# Patient Record
Sex: FEMALE | Race: WHITE | NOT HISPANIC OR LATINO | Employment: OTHER | URBAN - METROPOLITAN AREA
[De-identification: names, ages, dates, MRNs, and addresses within clinical notes are randomized per-mention and may not be internally consistent; named-entity substitution may affect disease eponyms.]

---

## 2023-01-23 ENCOUNTER — TRANSFERRED RECORDS (OUTPATIENT)
Dept: HEALTH INFORMATION MANAGEMENT | Facility: CLINIC | Age: 88
End: 2023-01-23

## 2023-02-13 ENCOUNTER — TRANSFERRED RECORDS (OUTPATIENT)
Dept: HEALTH INFORMATION MANAGEMENT | Facility: CLINIC | Age: 88
End: 2023-02-13

## 2023-04-05 ENCOUNTER — TRANSFERRED RECORDS (OUTPATIENT)
Dept: HEALTH INFORMATION MANAGEMENT | Facility: CLINIC | Age: 88
End: 2023-04-05
Payer: MEDICARE

## 2023-04-11 ENCOUNTER — TRANSFERRED RECORDS (OUTPATIENT)
Dept: HEALTH INFORMATION MANAGEMENT | Facility: CLINIC | Age: 88
End: 2023-04-11
Payer: MEDICARE

## 2023-04-12 ENCOUNTER — TRANSCRIBE ORDERS (OUTPATIENT)
Dept: OTHER | Age: 88
End: 2023-04-12

## 2023-04-12 ENCOUNTER — PATIENT OUTREACH (OUTPATIENT)
Dept: ONCOLOGY | Facility: CLINIC | Age: 88
End: 2023-04-12
Payer: MEDICARE

## 2023-04-12 DIAGNOSIS — C52 SQUAMOUS CELL CARCINOMA OF VAGINA (H): Primary | ICD-10-CM

## 2023-04-12 DIAGNOSIS — C52 VAGINAL CANCER (H): Primary | ICD-10-CM

## 2023-04-12 NOTE — PROGRESS NOTES
New Patient Radiation Oncology Nurse Navigator Note     Referral Date: 4/12/23    Referring provider: Dr. 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 Will have intake team obtain all records per protocol, then follow-up accordingly.     Records Needed:   Need records from [facility] per [facility] protocol     Additional testing needed prior to consult:   Pending review of records    Referral updates and Plan:  Referral marked for records collection. Will follow-up once records are received.     Dinora Becerril, BSN, RN, PHN, OCN  Hematology/Oncology Nurse Navigator  [facility]  [phone]

## 2023-04-13 ENCOUNTER — TELEPHONE (OUTPATIENT)
Dept: RADIATION ONCOLOGY | Facility: CLINIC | Age: 88
End: 2023-04-13
Payer: MEDICARE

## 2023-04-13 NOTE — TELEPHONE ENCOUNTER
RN called and spoke to pt, explained her upcoming MRI and consult appointment 4/24/23. [facility] referral completed and faxed. Map information e-mailed to pt. Pt verbalized understanding and all questions were answered at this time.

## 2023-04-17 NOTE — TELEPHONE ENCOUNTER
Action 2023 8:10 AM ABT   Action Taken Called [facility] [phone] still unable to speak to them regarding dicom request from , LVM for callback with update on DICOM    Called [facility], and spoke to Carmelita, she confirms that images will be pushed over now.    8:57 AM  Images from [facility] received and resolved to PACS     Action 2023 8:37 AM ABT   Action Taken Called and unable to speak to  [facility] Nataly, LVM regarding rad onc DICOM disc    Called [facility] and sena to speak to team, LVM regarding image request     Action 2023 1:04 PM ABT   Action Taken Called [facility] [phone] and spoke to Syl, she states to send rad onc request to Fax: [phone]     MEDICAL RECORDS REQUEST   Radiation Oncology  [address] [place] [address]  Fax: [phone]          FUTURE VISIT INFORMATION                                                   Mima Sutton, : 1935 scheduled for future visit at [facility] Radiation Oncology    RECORDS REQUESTED FOR VISIT                                                     GYNECOLOGY     OFFICE NOTE from PCP [facility]: Cortney Reynaga NP   OFFICE NOTE from OB/GYN [facility]: Dr. Lizbeth Mehta  22: Mima Christensen NP   MEDICATION LIST External: [facility]     PATHOLOGY REPORTS Report in [facility]: [id number]   ANYTHING RELATED TO DIAGNOSIS [facility] Most recent 23   IMAGING (NEED IMAGES & REPORT)     CT SCANS PACS [facility]:  23: CT Abd Pel   PET PACS [facility]:  23: PET CT Skull   PREVIOUS RADIATION  Req -[facility]   WHAT HEALTHCARE FACILITY External: [facility]    RADIATION ONCOLOGIST NOTES External: [facility] Consult:  23: Dr. Donnie Painter    F/U  23: Dr. Donnie Painter   RADIATION TREATMENT SUMMARY NOTES     RADIATION TREATMENT PLAN     DVH =  DOSE VOLUME HISTOGRAM     DICOM CD (TX PLANNING CT, TX PLAN, STRUCTURE SET) *If no DICOM avail ask for DRRs     *[facility] Radiation Oncology patients send to [facility] with ATTN: CHOLO/Berny Dosi/Physics    *only [facility] patient's, use [facility]

## 2023-04-19 ENCOUNTER — TELEPHONE (OUTPATIENT)
Dept: RADIATION ONCOLOGY | Facility: CLINIC | Age: 88
End: 2023-04-19
Payer: MEDICARE

## 2023-04-19 NOTE — TELEPHONE ENCOUNTER
RN spoke with pt and clarified her appointments for Monday 4/24/23. RN explained to the pt that she will now be seeing Dr. Jacques. Pt verbalizes understanding.

## 2023-04-23 NOTE — PROGRESS NOTES
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 Donnie Painter of [facility].    HISTORY OF PRESENT ILLNESS: Ms. 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    She initially developed vaginal bleeding in the fall of , which became progressively worse with clots. She also became more fatigued and lost 20 lb over the course of previous year. She saw her primary care Ms. Mima Christensen NP on 2022. Exam showed a \"very friable vagina mass attached to the right lateral vagina\". An endometrial biopsy was performed at that time, which showed benign endocervix, with no definitive endometrial glands or stroma.     Mima then saw Dr. Mehta on Ob/Gyn on 2022. Exam documented vagina \"with irregular friable 4 cm mass at 8:00, 2 cm inside the vagina\". An office biopsy was consistent with poorly differentiated squamous cell carcinoma, 16 negative.     She established care with Dr. Donnie Painter in [facility] and underwent staging workup. CT of the abdomen/pelvis with contrast on 2023 demonstrated an abnormal appearing vulva, with eccentric soft tissue along the right lateral and posterior margin of the vaginal wall. The vagina was diffusely fluid-filled, but otherwise no definitive finding of matty or distant metastases.    She met with Dr. Garcia, who recommended against " "surgery. She the established care with Dr. Donnie Painter in Radiation Oncology in [place], who documented the following on exam: \"Speculum exam was attempted, which immediately revealed a bulky, bleeding tumor-like mass occupying the entire lower vagina just beyond the introitus. There was no vulvar involvement. Vaginal exam confirmed that the tumor is tethered at about 7:00 at the right posterior vaginal wall. I was able to pass 1 finger beyond the tumor, which measured approximately 6 cm in the craniocadual direction. Cervix was palpably normal. Pelvirectal examination confirmed no evidence of parametrial or rectal wall invasion.\".     Mima completed staging PET/CT on 2/13/2023 which revealed a large vaginal mass with intense update with no evidence of metastatic spread. A pelvic MRI was refused by the patient due to low back pain.     She declined chemotherapus proceeded with radiation alone. She began treatment on 3/13 and completed this on 4/19/2023. After 20 fractions, repeat exam showed residual tumor at the right posterior-lateral vaginal canal, ~ 75% decreased in thickness, and measured 5 cm craniocaudally. As she demonstrated significant clinical response, EBRT boost was canceled and she was referred to us for consideration of brachytherapy boost.     On interview, she reports that her vaginal bleeding diminished during the fourth week of treatment, with vaginal discharing \"paler\" over time, and eventually turning \"clear with a touch of pink\".  She suffered mild nausea over a period of about 1 week, which was relieved by Zofran.  She did not lose any weight during radiotherapy.    At present, she denies any vaginal pain but does report desquamation over the treatment field over the past 10 days, specifically in her groin and vulva.  She has been applying lidocaine/aloe gel for relief.  Additionally, she is experiencing dysuria due to treatment-related dermatitis, and finds that regularly cleansing her " vulva helps reduce pain with urination.  Mima also contends with urinary frequency and urgency, which is largely unchanged over the past year.  She had 2 recent UTIs, which were both successfully treated; she denies any concern for UTI today.    Since treatment, she reports that her bowel movements are more regular and she is no longer constipated as was common before treatment.  Her stools are currently soft and formed with no diarrhea.    PAST MEDICAL HISTORY:   Right breast cancer, 2007  Fracture after a fall 02/2022  hypothyroidism  Insomnia  Glaucoma    S/p tonsillectomy  S/p mastectomy  S/p colonoscopy     CHEMOTHERAPY HISTORY: None    PAST RADIATION THERAPY HISTORY:   3/13/2023-4/19/2023  4500 cGy in 25 fractions, VMAT, 10 MV photon, 0.5 cm bolus over bilateral inguinal nodes daily        MEDICATIONS:   Current Outpatient Medications   Medication Sig Dispense Refill     Calcium-Magnesium-Zinc 333-133-5 MG TABS per tablet Take 1 tablet by mouth daily       lactobacillus rhamnosus, GG, (CULTURELL) capsule Take 1 capsule by mouth 2 times daily Probiotic       latanoprost (XALATAN) 0.005 % ophthalmic solution Apply 1 drop to eye       levothyroxine (SYNTHROID/LEVOTHROID) 75 MCG tablet Take 1 tablet six days a week and 1/2 tablet on day 7.       traZODone (DESYREL) 50 MG tablet Take 1 tablet by mouth nightly as needed       ibuprofen (ADVIL/MOTRIN) 100 MG tablet 100 mg       MAGNESIUM CITRATE PO Take 1 tablet by mouth daily         ALLERGIES:  allergic to tramadol.   Gluten  Beef  Cow's milk  Mold  Seasonal allergies    SOCIAL HISTORY:   Never smoker  Does not drink alcohol   Lives with her  in [place], [place]  Daughter Citlalli lives in [place]  A retired   Independent with ADLs    FAMILY HISTORY:   family history is not on file.    REVIEW OF SYMPTOMS:  A full 14-point review of systems was performed; negative other than HPI above.    PHYSICAL EXAMINATION:    BP (!) 160/75   Pulse  76   Wt 42.4 kg (93 lb 8 oz)   SpO2 98%   General: Awake, alert, pleasant, conversant, seated comfortably in office chair.  CV: Well-perfused, no cyanosis  Respiratory: No increased work of breathing on room air, no abnormal breath sounds audible  Skin: Significant erythema over the groins, consistent with treatment field of recent EBRT.  Otherwise unremarkable for age.  : External genitalia: Erythematous and significantly edematous vulva. Brisk erythema of the bilateral groin. On speculum exam using a small sized speculum, an irregular nodular mass was visualized along the right lateral and posterior vagina, starting about 2 cm from the introitus and not quite to the cervix. The cervix could be identified, though os was a little difficult to see. The mass has patchy white surface. On palpation, nodular mass was felt on the right and posterior vaginal wall. A small amount of clear to light pink discharge was encountered. Finally, a dilator of 2 cm diameter could be inserted into the vaginal canal.      IMAGING:  Pre-treatment PET/CT        MRI           ASSESSMENT AND PLAN: In summary, Ms. Sutton is an 86 yo with a poorly differentiated, HPV-independent squamous cell carcinoma of the vagina, s/p EBRT, who presents to our department for consideration of brachytherapy boost.    We reviewed imaging with Mima and her daughter, including her pretreatment PET/CT and her MRI obtained today. She did appear to have a partial response based on comparison of her MRI with PET/CT and description of pelvic exam.     Her residual disease is primarily intravaginal, though with some invasion of the levator ani muscle on the right. Most importantly, her vaginal vault could accommodate a 2 cm vaginal dilator, which is the size of the intravaginal obturator that is typically used with the interstitial brachytherapy. As such, we do believe she is a candidate for interetitial brachytherapy boost. We discussed the difference between a  brachytherapy boost vs. an EBRT boost and she is quite motivated towards a brachytherapy boost as part of her treatment regimen.     We described interstitial brachytherapy in detail, including its potential risks, benefits, side effects and its timeline, specifically including the 3-day inpatient stay. We also discussed that she will require a PAC visit given her age and the need for an epidural analgesia.  Mima and her daughter asked many good questions about her diagnosis, prognosis and treatment, all of which were answered.     At the end of the discussion, Mima tentatively wished to proceed with interstitial brachytherapy and informed consent was obtained. We will request an OR time the week of 5/8.    Thank you for allowing us to participate in this patient's care.  Please feel free to call with any questions or concerns.    Patient seen and discussed with Dr. Jacques, vishnu.    Ze Neely, MS4     Debra Jacques M.D./Ph.D.  Radiation Oncologist   Department of Radiation Oncology  [facility]  Phone: [phone]       I saw and examined the patient with the student.  I have reviewed and edited the student's note and agree with the plan of care.      I reviewed patient's chart, internal/external medical records, imaging studies (including actual images), labs and pathology reports.  I interviewed and counseled the patient face to face.  I additionally discussed the case with patient's referring physicians and care team.      120 minutes were spent on the date of the encounter doing chart review, history and exam, documentation and further activities as noted above.           Debra Jacques MD

## 2023-04-24 ENCOUNTER — HOSPITAL ENCOUNTER (OUTPATIENT)
Facility: CLINIC | Age: 88
End: 2023-04-24
Attending: RADIOLOGY | Admitting: RADIOLOGY
Payer: MEDICARE

## 2023-04-24 ENCOUNTER — OFFICE VISIT (OUTPATIENT)
Dept: RADIATION ONCOLOGY | Facility: CLINIC | Age: 88
End: 2023-04-24
Attending: RADIOLOGY
Payer: MEDICARE

## 2023-04-24 ENCOUNTER — HOSPITAL ENCOUNTER (INPATIENT)
Facility: CLINIC | Age: 88
Setting detail: SURGERY ADMIT
End: 2023-04-24
Attending: RADIOLOGY | Admitting: RADIOLOGY
Payer: MEDICARE

## 2023-04-24 ENCOUNTER — PRE VISIT (OUTPATIENT)
Dept: RADIATION ONCOLOGY | Facility: CLINIC | Age: 88
End: 2023-04-24
Payer: MEDICARE

## 2023-04-24 ENCOUNTER — HOSPITAL ENCOUNTER (OUTPATIENT)
Dept: MRI IMAGING | Facility: CLINIC | Age: 88
Discharge: HOME | End: 2023-04-24
Attending: RADIOLOGY | Admitting: RADIOLOGY
Payer: MEDICARE

## 2023-04-24 ENCOUNTER — PREP FOR PROCEDURE (OUTPATIENT)
Dept: RADIATION ONCOLOGY | Facility: CLINIC | Age: 88
End: 2023-04-24

## 2023-04-24 VITALS
DIASTOLIC BLOOD PRESSURE: 75 MMHG | OXYGEN SATURATION: 98 % | HEART RATE: 76 BPM | SYSTOLIC BLOOD PRESSURE: 160 MMHG | WEIGHT: 93.5 LBS

## 2023-04-24 DIAGNOSIS — C52 VAGINAL CANCER (H): ICD-10-CM

## 2023-04-24 DIAGNOSIS — C52 VAGINAL CANCER (H): Primary | ICD-10-CM

## 2023-04-24 PROCEDURE — 99417 PROLNG OP E/M EACH 15 MIN: CPT | Performed by: RADIOLOGY

## 2023-04-24 PROCEDURE — 99205 OFFICE O/P NEW HI 60 MIN: CPT | Performed by: RADIOLOGY

## 2023-04-24 PROCEDURE — G0463 HOSPITAL OUTPT CLINIC VISIT: HCPCS | Performed by: RADIOLOGY

## 2023-04-24 PROCEDURE — G1010 CDSM STANSON: HCPCS | Performed by: RADIOLOGY

## 2023-04-24 PROCEDURE — 72197 MRI PELVIS W/O & W/DYE: CPT | Mod: 26 | Performed by: RADIOLOGY

## 2023-04-24 PROCEDURE — 255N000002 HC RX 255 OP 636: Performed by: RADIOLOGY

## 2023-04-24 PROCEDURE — G1010 CDSM STANSON: HCPCS

## 2023-04-24 PROCEDURE — A9585 GADOBUTROL INJECTION: HCPCS | Performed by: RADIOLOGY

## 2023-04-24 RX ORDER — LATANOPROST 50 UG/ML
1 SOLUTION/ DROPS OPHTHALMIC
COMMUNITY
Start: 2021-09-08

## 2023-04-24 RX ORDER — LEVOTHYROXINE SODIUM 75 UG/1
TABLET ORAL
COMMUNITY
Start: 2023-04-10

## 2023-04-24 RX ORDER — GADOBUTROL 604.72 MG/ML
5 INJECTION INTRAVENOUS ONCE
Status: COMPLETED | OUTPATIENT
Start: 2023-04-24 | End: 2023-04-24

## 2023-04-24 RX ORDER — LATANOPROST 50 UG/ML
SOLUTION/ DROPS OPHTHALMIC
COMMUNITY
Start: 2023-01-09 | End: 2023-04-24

## 2023-04-24 RX ORDER — LACTOBACILLUS RHAMNOSUS GG 10B CELL
1 CAPSULE ORAL 2 TIMES DAILY
COMMUNITY

## 2023-04-24 RX ORDER — TRAZODONE HYDROCHLORIDE 50 MG/1
1 TABLET, FILM COATED ORAL
COMMUNITY
Start: 2023-03-22

## 2023-04-24 RX ADMIN — GADOBUTROL 5 ML: 604.72 INJECTION INTRAVENOUS at 11:25

## 2023-04-24 ASSESSMENT — ENCOUNTER SYMPTOMS
FALLS: 0
INSOMNIA: 0
DEPRESSION: 0
DIAPHORESIS: 0
NERVOUS/ANXIOUS: 0
SORE THROAT: 0
DIZZINESS: 0
DOUBLE VISION: 0
BRUISES/BLEEDS EASILY: 0
NECK PAIN: 0
CHILLS: 0
BLOOD IN STOOL: 0
BACK PAIN: 0
CONSTIPATION: 0
COUGH: 0
BLURRED VISION: 0
HEADACHES: 0
WEIGHT LOSS: 0
HEMATURIA: 0
FREQUENCY: 0
SEIZURES: 0
DIARRHEA: 0
TINGLING: 0
FEVER: 0
NAUSEA: 0
EYE PAIN: 0

## 2023-04-24 NOTE — PROGRESS NOTES
HPI    INITIAL PATIENT ASSESSMENT    Diagnosis: Vaginal cancer    Prior radiation therapy:   Site Treated: pelvis  Facility: [facility]  Dates: external radiaiton  2/28/23- 4/18/23  Dose: DICOM here    Prior chemotherapy: None    Prior hormonal therapy:No    Pain Eval:  Pain r/t radiation burns, using OTV lidocaine cream and Ibuprofen.    Psychosocial  Living arrangements:   Fall Risk: independent   referral needs: Not needed    Advanced Directive: Yes - Location: at home and copy with her daughter  Implantable Cardiac Device? No    Onset of menarche: @ age 14  LMP: No LMP recorded.  Onset of menopause: @ age 55  Abnormal vaginal bleeding/discharge: No. Wearing a pad for some clear and occ. Pinkish discharge.  Are you pregnant? No  Reproductive note: 2 children    Nurse face-to-face time: Level 4:  15 min face to face time    Review of Systems   Constitutional: Positive for malaise/fatigue (R/T radiation). Negative for chills, diaphoresis, fever and weight loss.   HENT: Negative for ear pain, nosebleeds and sore throat.    Eyes: Negative for blurred vision, double vision and pain.   Respiratory: Negative for cough. Shortness of breath: SOB with activity.    Cardiovascular: Negative for chest pain and leg swelling.   Gastrointestinal: Negative for blood in stool, constipation, diarrhea and nausea (Had some nausea with radiaiton, meds with good results).   Genitourinary: Negative for frequency, hematuria and urgency.   Musculoskeletal: Negative for back pain, falls, joint pain and neck pain.   Skin: Positive for rash (Radiation burn on the vulva and low pelvis).   Neurological: Negative for dizziness, tingling, seizures and headaches.   Endo/Heme/Allergies: Does not bruise/bleed easily.   Psychiatric/Behavioral: Negative for depression. The patient is not nervous/anxious and does not have insomnia.

## 2023-04-24 NOTE — LETTER
"    2023         RE: Mima Sutton   [facility] [address]        Dear Colleague,    Thank you for referring your patient, Mima Sutton, to the [facility] RADIATION ONCOLOGY. Please see a copy of my visit note below.    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 Donnie Painter of [facility].    HISTORY OF PRESENT ILLNESS: Ms. 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    She initially developed vaginal bleeding in the fall of , which became progressively worse with clots. She also became more fatigued and lost 20 lb over the course of previous year. She saw her primary care Ms. Mima Christensen NP on 2022. Exam showed a \"very friable vagina mass attached to the right lateral vagina\". An endometrial biopsy was performed at that time, which showed benign endocervix, with no definitive endometrial glands or stroma.     Mima then saw Dr. Mehta on Ob/Gyn on 2022. Exam documented vagina \"with irregular friable 4 cm mass at 8:00, 2 cm inside the vagina\". An office biopsy was consistent with poorly differentiated squamous cell carcinoma, 16 negative.     She established care with Dr. Donnie Painter in [facility] and underwent staging workup. CT of the abdomen/pelvis with contrast on 2023 demonstrated an abnormal appearing vulva, with " "eccentric soft tissue along the right lateral and posterior margin of the vaginal wall. The vagina was diffusely fluid-filled, but otherwise no definitive finding of matty or distant metastases.    She met with Dr. Garcia, who recommended against surgery. She the established care with Dr. Donnie Painter in Radiation Oncology in [place], who documented the following on exam: \"Speculum exam was attempted, which immediately revealed a bulky, bleeding tumor-like mass occupying the entire lower vagina just beyond the introitus. There was no vulvar involvement. Vaginal exam confirmed that the tumor is tethered at about 7:00 at the right posterior vaginal wall. I was able to pass 1 finger beyond the tumor, which measured approximately 6 cm in the craniocadual direction. Cervix was palpably normal. Pelvirectal examination confirmed no evidence of parametrial or rectal wall invasion.\"Js Guillermo completed staging PET/CT on 2/13/2023 which revealed a large vaginal mass with intense update with no evidence of metastatic spread. A pelvic MRI was refused by the patient due to low back pain.     She declined chemotherapus proceeded with radiation alone. She began treatment on 3/13 and completed this on 4/19/2023. After 20 fractions, repeat exam showed residual tumor at the right posterior-lateral vaginal canal, ~ 75% decreased in thickness, and measured 5 cm craniocaudally. As she demonstrated significant clinical response, EBRT boost was canceled and she was referred to us for consideration of brachytherapy boost.     On interview, she reports that her vaginal bleeding diminished during the fourth week of treatment, with vaginal discharing \"paler\" over time, and eventually turning \"clear with a touch of pink\".  She suffered mild nausea over a period of about 1 week, which was relieved by Zofran.  She did not lose any weight during radiotherapy.    At present, she denies any vaginal pain but does report desquamation over the " treatment field over the past 10 days, specifically in her groin and vulva.  She has been applying lidocaine/aloe gel for relief.  Additionally, she is experiencing dysuria due to treatment-related dermatitis, and finds that regularly cleansing her vulva helps reduce pain with urination.  Mima also contends with urinary frequency and urgency, which is largely unchanged over the past year.  She had 2 recent UTIs, which were both successfully treated; she denies any concern for UTI today.    Since treatment, she reports that her bowel movements are more regular and she is no longer constipated as was common before treatment.  Her stools are currently soft and formed with no diarrhea.    PAST MEDICAL HISTORY:   Right breast cancer, 2007  Fracture after a fall 02/2022  hypothyroidism  Insomnia  Glaucoma    S/p tonsillectomy  S/p mastectomy  S/p colonoscopy     CHEMOTHERAPY HISTORY: None    PAST RADIATION THERAPY HISTORY:   3/13/2023-4/19/2023  4500 cGy in 25 fractions, VMAT, 10 MV photon, 0.5 cm bolus over bilateral inguinal nodes daily        MEDICATIONS:   Current Outpatient Medications   Medication Sig Dispense Refill     Calcium-Magnesium-Zinc 333-133-5 MG TABS per tablet Take 1 tablet by mouth daily       lactobacillus rhamnosus, GG, (CULTURELL) capsule Take 1 capsule by mouth 2 times daily Probiotic       latanoprost (XALATAN) 0.005 % ophthalmic solution Apply 1 drop to eye       levothyroxine (SYNTHROID/LEVOTHROID) 75 MCG tablet Take 1 tablet six days a week and 1/2 tablet on day 7.       traZODone (DESYREL) 50 MG tablet Take 1 tablet by mouth nightly as needed       ibuprofen (ADVIL/MOTRIN) 100 MG tablet 100 mg       MAGNESIUM CITRATE PO Take 1 tablet by mouth daily         ALLERGIES:  allergic to tramadol.   Gluten  Beef  Cow's milk  Mold  Seasonal allergies    SOCIAL HISTORY:   Never smoker  Does not drink alcohol   Lives with her  in [place], [place]  Daughter Citlalli lives in [place]  A  retired   Independent with ADLs    FAMILY HISTORY:   family history is not on file.    REVIEW OF SYMPTOMS:  A full 14-point review of systems was performed; negative other than HPI above.    PHYSICAL EXAMINATION:    BP (!) 160/75   Pulse 76   Wt 42.4 kg (93 lb 8 oz)   SpO2 98%   General: Awake, alert, pleasant, conversant, seated comfortably in office chair.  CV: Well-perfused, no cyanosis  Respiratory: No increased work of breathing on room air, no abnormal breath sounds audible  Skin: Significant erythema over the groins, consistent with treatment field of recent EBRT.  Otherwise unremarkable for age.  : External genitalia: Erythematous and significantly edematous vulva. Brisk erythema of the bilateral groin. On speculum exam using a small sized speculum, an irregular nodular mass was visualized along the right lateral and posterior vagina, starting about 2 cm from the introitus and not quite to the cervix. The cervix could be identified, though os was a little difficult to see. The mass has patchy white surface. On palpation, nodular mass was felt on the right and posterior vaginal wall. A small amount of clear to light pink discharge was encountered. Finally, a dilator of 2 cm diameter could be inserted into the vaginal canal.      IMAGING:  Pre-treatment PET/CT        MRI           ASSESSMENT AND PLAN: In summary, Ms. Sutton is an 86 yo with a poorly differentiated, HPV-independent squamous cell carcinoma of the vagina, s/p EBRT, who presents to our department for consideration of brachytherapy boost.    We reviewed imaging with Mima and her daughter, including her pretreatment PET/CT and her MRI obtained today. She did appear to have a partial response based on comparison of her MRI with PET/CT and description of pelvic exam.     Her residual disease is primarily intravaginal, though with some invasion of the levator ani muscle on the right. Most importantly, her vaginal vault could accommodate a  2 cm vaginal dilator, which is the size of the intravaginal obturator that is typically used with the interstitial brachytherapy. As such, we do believe she is a candidate for interetitial brachytherapy boost. We discussed the difference between a brachytherapy boost vs. an EBRT boost and she is quite motivated towards a brachytherapy boost as part of her treatment regimen.     We described interstitial brachytherapy in detail, including its potential risks, benefits, side effects and its timeline, specifically including the 3-day inpatient stay. We also discussed that she will require a PAC visit given her age and the need for an epidural analgesia.  Mima and her daughter asked many good questions about her diagnosis, prognosis and treatment, all of which were answered.     At the end of the discussion, Mima tentatively wished to proceed with interstitial brachytherapy and informed consent was obtained. We will request an OR time the week of 5/8.    Thank you for allowing us to participate in this patient's care.  Please feel free to call with any questions or concerns.    Patient seen and discussed with Dr. Jacques, vishnu.    Ze Neely, MS4     Debra Jacques M.D./Ph.D.  Radiation Oncologist   Department of Radiation Oncology  [facility]  Phone: [phone]       I saw and examined the patient with the student.  I have reviewed and edited the student's note and agree with the plan of care.      I reviewed patient's chart, internal/external medical records, imaging studies (including actual images), labs and pathology reports.  I interviewed and counseled the patient face to face.  I additionally discussed the case with patient's referring physicians and care team.      120 minutes were spent on the date of the encounter doing chart review, history and exam, documentation and further activities as noted above.           Debra Jacques MD      HPI    INITIAL PATIENT  ASSESSMENT    Diagnosis: Vaginal cancer    Prior radiation therapy:   Site Treated: pelvis  Facility: [facility]  Dates: external radiaiton  2/28/23- 4/18/23  Dose: DICOM here    Prior chemotherapy: None    Prior hormonal therapy:No    Pain Eval:  Pain r/t radiation burns, using OTV lidocaine cream and Ibuprofen.    Psychosocial  Living arrangements:   Fall Risk: independent   referral needs: Not needed    Advanced Directive: Yes - Location: at home and copy with her daughter  Implantable Cardiac Device? No    Onset of menarche: @ age 14  LMP: No LMP recorded.  Onset of menopause: @ age 55  Abnormal vaginal bleeding/discharge: No. Wearing a pad for some clear and occ. Pinkish discharge.  Are you pregnant? No  Reproductive note: 2 children    Nurse face-to-face time: Level 4:  15 min face to face time    Review of Systems   Constitutional: Positive for malaise/fatigue (R/T radiation). Negative for chills, diaphoresis, fever and weight loss.   HENT: Negative for ear pain, nosebleeds and sore throat.    Eyes: Negative for blurred vision, double vision and pain.   Respiratory: Negative for cough. Shortness of breath: SOB with activity.    Cardiovascular: Negative for chest pain and leg swelling.   Gastrointestinal: Negative for blood in stool, constipation, diarrhea and nausea (Had some nausea with radiaiton, meds with good results).   Genitourinary: Negative for frequency, hematuria and urgency.   Musculoskeletal: Negative for back pain, falls, joint pain and neck pain.   Skin: Positive for rash (Radiation burn on the vulva and low pelvis).   Neurological: Negative for dizziness, tingling, seizures and headaches.   Endo/Heme/Allergies: Does not bruise/bleed easily.   Psychiatric/Behavioral: Negative for depression. The patient is not nervous/anxious and does not have insomnia.                  Again, thank you for allowing me to participate in the care of your patient.         Sincerely,        Debra Jacques MD

## 2023-04-26 NOTE — TELEPHONE ENCOUNTER
FUTURE VISIT INFORMATION      SURGERY INFORMATION:    Date: 23    Location: [facility] OR    Surgeon:  Debra Jacques MD    Anesthesia Type:  General    Procedure: INSERTION, NEEDLE, WITH ULTRASOUND GUIDANCE, FOR INTERSTITIAL BRACHYTHERAPY INSERTION, FIDUCIAL MARKER SEED, CERVIX, FOR RADIATION THERAPY    Consult: ov 23    RECORDS REQUESTED FROM:       Primary Care Provider: Cortney Reynaga N.P.  - [facility]    Most recent EKG+ Tracin22- [facility]    Most recent ECHO: 19- [facility]

## 2023-04-27 RX ORDER — PHENAZOPYRIDINE HYDROCHLORIDE 200 MG/1
200 TABLET, FILM COATED ORAL ONCE
Status: CANCELLED | OUTPATIENT
Start: 2023-04-27 | End: 2023-04-27

## 2023-04-28 ENCOUNTER — TELEPHONE (OUTPATIENT)
Dept: RADIATION ONCOLOGY | Facility: CLINIC | Age: 88
End: 2023-04-28
Payer: MEDICARE

## 2023-04-28 NOTE — TELEPHONE ENCOUNTER
RN spoke with pt, pt has decided she does not want to move forward with the HDR, interstitial procedure. RN encouraged pt to call the [facility] and let them know of her decision. Pt verbalized understanding and will reach out to [facility].   Dr. Jacques notified of above decision. Appointments will be canceled.

## 2023-05-05 ENCOUNTER — PRE VISIT (OUTPATIENT)
Dept: SURGERY | Facility: CLINIC | Age: 88
End: 2023-05-05

## 2023-05-21 ENCOUNTER — HEALTH MAINTENANCE LETTER (OUTPATIENT)
Age: 88
End: 2023-05-21

## 2024-03-10 ENCOUNTER — HEALTH MAINTENANCE LETTER (OUTPATIENT)
Age: 89
End: 2024-03-10

## 2024-07-28 ENCOUNTER — HEALTH MAINTENANCE LETTER (OUTPATIENT)
Age: 89
End: 2024-07-28

## 2025-03-16 ENCOUNTER — HEALTH MAINTENANCE LETTER (OUTPATIENT)
Age: OVER 89
End: 2025-03-16

## 2025-08-10 ENCOUNTER — HEALTH MAINTENANCE LETTER (OUTPATIENT)
Age: OVER 89
End: 2025-08-10